# Patient Record
Sex: MALE | Race: WHITE | NOT HISPANIC OR LATINO | Employment: OTHER | URBAN - METROPOLITAN AREA
[De-identification: names, ages, dates, MRNs, and addresses within clinical notes are randomized per-mention and may not be internally consistent; named-entity substitution may affect disease eponyms.]

---

## 2019-06-11 PROBLEM — R07.9 CHEST PAIN WITH HIGH RISK OF ACUTE CORONARY SYNDROME: Status: ACTIVE | Noted: 2019-06-11

## 2019-06-26 PROBLEM — K21.9 GASTROESOPHAGEAL REFLUX DISEASE WITHOUT ESOPHAGITIS: Status: ACTIVE | Noted: 2019-06-26

## 2019-07-10 PROBLEM — I10 HYPERTENSION: Status: ACTIVE | Noted: 2019-07-10

## 2019-07-10 PROBLEM — E78.00 HYPERCHOLESTEREMIA: Status: ACTIVE | Noted: 2019-07-10

## 2020-01-06 PROBLEM — I25.10 CORONARY ARTERY DISEASE DUE TO CALCIFIED CORONARY LESION: Status: ACTIVE | Noted: 2020-01-06

## 2020-01-06 PROBLEM — R09.89 LEFT CAROTID BRUIT: Status: ACTIVE | Noted: 2020-01-06

## 2020-01-06 PROBLEM — I25.84 CORONARY ARTERY DISEASE DUE TO CALCIFIED CORONARY LESION: Status: ACTIVE | Noted: 2020-01-06

## 2021-03-24 ENCOUNTER — OFFICE VISIT (OUTPATIENT)
Dept: URGENT CARE | Facility: CLINIC | Age: 61
End: 2021-03-24
Payer: MEDICAID

## 2021-03-24 VITALS
WEIGHT: 165 LBS | DIASTOLIC BLOOD PRESSURE: 85 MMHG | SYSTOLIC BLOOD PRESSURE: 157 MMHG | HEIGHT: 67 IN | OXYGEN SATURATION: 98 % | TEMPERATURE: 97 F | HEART RATE: 70 BPM | RESPIRATION RATE: 16 BRPM | BODY MASS INDEX: 25.9 KG/M2

## 2021-03-24 DIAGNOSIS — K04.7 DENTAL ABSCESS: Primary | ICD-10-CM

## 2021-03-24 DIAGNOSIS — J30.2 SEASONAL ALLERGIC RHINITIS, UNSPECIFIED TRIGGER: ICD-10-CM

## 2021-03-24 PROCEDURE — 99203 PR OFFICE/OUTPT VISIT, NEW, LEVL III, 30-44 MIN: ICD-10-PCS | Mod: S$GLB,,, | Performed by: NURSE PRACTITIONER

## 2021-03-24 PROCEDURE — 99203 OFFICE O/P NEW LOW 30 MIN: CPT | Mod: S$GLB,,, | Performed by: NURSE PRACTITIONER

## 2021-03-24 RX ORDER — CLINDAMYCIN HYDROCHLORIDE 150 MG/1
450 CAPSULE ORAL EVERY 8 HOURS
Qty: 90 CAPSULE | Refills: 0 | Status: SHIPPED | OUTPATIENT
Start: 2021-03-24 | End: 2021-04-03

## 2021-03-24 RX ORDER — LIDOCAINE HYDROCHLORIDE 20 MG/ML
SOLUTION OROPHARYNGEAL
Qty: 100 ML | Refills: 0 | Status: SHIPPED | OUTPATIENT
Start: 2021-03-24 | End: 2022-02-17

## 2021-03-24 RX ORDER — LORATADINE 10 MG/1
10 TABLET ORAL DAILY
Qty: 20 TABLET | Refills: 0 | Status: SHIPPED | OUTPATIENT
Start: 2021-03-24 | End: 2022-02-17

## 2022-02-17 ENCOUNTER — OFFICE VISIT (OUTPATIENT)
Dept: URGENT CARE | Facility: CLINIC | Age: 62
End: 2022-02-17
Payer: MEDICAID

## 2022-02-17 VITALS
RESPIRATION RATE: 19 BRPM | HEIGHT: 68 IN | BODY MASS INDEX: 23.49 KG/M2 | TEMPERATURE: 99 F | HEART RATE: 78 BPM | SYSTOLIC BLOOD PRESSURE: 135 MMHG | DIASTOLIC BLOOD PRESSURE: 86 MMHG | WEIGHT: 155 LBS | OXYGEN SATURATION: 97 %

## 2022-02-17 DIAGNOSIS — L50.9 URTICARIA: ICD-10-CM

## 2022-02-17 DIAGNOSIS — R22.0 TONGUE SWELLING: Primary | ICD-10-CM

## 2022-02-17 DIAGNOSIS — T78.40XA ACUTE ALLERGIC REACTION, INITIAL ENCOUNTER: ICD-10-CM

## 2022-02-17 PROCEDURE — 3008F PR BODY MASS INDEX (BMI) DOCUMENTED: ICD-10-PCS | Mod: CPTII,S$GLB,, | Performed by: PHYSICIAN ASSISTANT

## 2022-02-17 PROCEDURE — 99214 PR OFFICE/OUTPT VISIT, EST, LEVL IV, 30-39 MIN: ICD-10-PCS | Mod: S$GLB,,, | Performed by: PHYSICIAN ASSISTANT

## 2022-02-17 PROCEDURE — 99214 OFFICE O/P EST MOD 30 MIN: CPT | Mod: S$GLB,,, | Performed by: PHYSICIAN ASSISTANT

## 2022-02-17 PROCEDURE — 3008F BODY MASS INDEX DOCD: CPT | Mod: CPTII,S$GLB,, | Performed by: PHYSICIAN ASSISTANT

## 2022-02-17 PROCEDURE — 1159F MED LIST DOCD IN RCRD: CPT | Mod: CPTII,S$GLB,, | Performed by: PHYSICIAN ASSISTANT

## 2022-02-17 PROCEDURE — 3075F SYST BP GE 130 - 139MM HG: CPT | Mod: CPTII,S$GLB,, | Performed by: PHYSICIAN ASSISTANT

## 2022-02-17 PROCEDURE — 1160F RVW MEDS BY RX/DR IN RCRD: CPT | Mod: CPTII,S$GLB,, | Performed by: PHYSICIAN ASSISTANT

## 2022-02-17 PROCEDURE — 1159F PR MEDICATION LIST DOCUMENTED IN MEDICAL RECORD: ICD-10-PCS | Mod: CPTII,S$GLB,, | Performed by: PHYSICIAN ASSISTANT

## 2022-02-17 PROCEDURE — 1160F PR REVIEW ALL MEDS BY PRESCRIBER/CLIN PHARMACIST DOCUMENTED: ICD-10-PCS | Mod: CPTII,S$GLB,, | Performed by: PHYSICIAN ASSISTANT

## 2022-02-17 PROCEDURE — 3079F PR MOST RECENT DIASTOLIC BLOOD PRESSURE 80-89 MM HG: ICD-10-PCS | Mod: CPTII,S$GLB,, | Performed by: PHYSICIAN ASSISTANT

## 2022-02-17 PROCEDURE — 3075F PR MOST RECENT SYSTOLIC BLOOD PRESS GE 130-139MM HG: ICD-10-PCS | Mod: CPTII,S$GLB,, | Performed by: PHYSICIAN ASSISTANT

## 2022-02-17 PROCEDURE — 3079F DIAST BP 80-89 MM HG: CPT | Mod: CPTII,S$GLB,, | Performed by: PHYSICIAN ASSISTANT

## 2022-02-17 RX ORDER — DIPHENHYDRAMINE HCL 25 MG
25 CAPSULE ORAL
Status: COMPLETED | OUTPATIENT
Start: 2022-02-17 | End: 2022-02-17

## 2022-02-17 RX ORDER — PREDNISONE 50 MG/1
50 TABLET ORAL DAILY
Qty: 5 TABLET | Refills: 0 | Status: SHIPPED | OUTPATIENT
Start: 2022-02-18 | End: 2022-02-23

## 2022-02-17 RX ORDER — FAMOTIDINE 20 MG/1
20 TABLET, FILM COATED ORAL 2 TIMES DAILY
Qty: 20 TABLET | Refills: 0 | Status: SHIPPED | OUTPATIENT
Start: 2022-02-17 | End: 2022-02-27

## 2022-02-17 RX ORDER — METOPROLOL SUCCINATE 25 MG/1
25 TABLET, EXTENDED RELEASE ORAL DAILY
COMMUNITY
Start: 2022-02-12

## 2022-02-17 RX ORDER — ALPRAZOLAM 0.25 MG/1
0.25 TABLET ORAL DAILY PRN
COMMUNITY
Start: 2022-02-01

## 2022-02-17 RX ORDER — EZETIMIBE 10 MG/1
10 TABLET ORAL DAILY
COMMUNITY
Start: 2022-01-26

## 2022-02-17 RX ADMIN — Medication 25 MG: at 04:02

## 2022-02-17 NOTE — PATIENT INSTRUCTIONS
You must understand that you have received treatment at an Urgent Care facility only, and that you may be  released before all of your medical problems are known or treated. Urgent Care facilities are not equipped to  handle life threatening emergencies. It is recommended that you seek care at an Emergency Department for  further evaluation of worsening or concerning symptoms, or possibly life threatening conditions as  discussed.  Patient Education       Allergic Reaction ED   General Information   You came to the Emergency Department (ED) for an allergic reaction. This is your bodys response to an allergen. Some people have a rash, hives, trouble breathing, or swelling. Others may throw up, feel dizzy, or pass out. This problem can be caused by things like:  · Food.  · Medicine.  · Insect stings or bites.  · Exercise.  · Latex.  · Triggers that cant be identified at the time.  Some people can come in contact with these things and have no problems. But when you have an allergy to something, your body acts as if the substance is harming you. This causes symptoms.  What care is needed at home?   · Call your regular doctor to let them know you were in the ED. Make a follow-up appointment if you were told to.  · If you were told to see an allergist, ask your regular doctor for a referral.  · If you were prescribed an epinephrine autoinjector, fill the prescription right away. Make sure you know how to use it.  · Avoid the allergen if you know what caused your reaction. You may need to work with your regular doctor or an allergist to find what has caused your reaction. Then you can try to avoid it in the future.  · Use a cool washcloth on your eyes or skin.  · If possible, rest for the next few days.  · Use over-the-counter medicines to help with your milder symptoms.  · Use antihistamine eye drops to help with itching and hives.  When do I need to get emergency help?   · Call an ambulance right away if:   ? You have  signs of a severe reaction like:  § You have trouble breathing, wheezing, or have a cough that wont stop.  § You feel like your throat is closing or your lips or tongue are swelling.  § You feel very weak like you are going to pass out, or actually pass out.  ? If you have signs of a severe allergic reaction, use your epinephrine autoinjector right away if you have one, then call for an ambulance.  When do I need to call the doctor?   · You are not  having a severe reaction but do have other signs of an allergic reaction, such as:  ? You have a rash, skin redness, flushing, or hives.  ? Your skin itches.  ? You have belly pain or an upset stomach.  · You are not feeling better in 2 to 3 days.  · You have new or worsening symptoms.  Last Reviewed Date   2021-02-11  Consumer Information Use and Disclaimer   This information is not specific medical advice and does not replace information you receive from your health care provider. This is only a brief summary of general information. It does NOT include all information about conditions, illnesses, injuries, tests, procedures, treatments, therapies, discharge instructions or life-style choices that may apply to you. You must talk with your health care provider for complete information about your health and treatment options. This information should not be used to decide whether or not to accept your health care providers advice, instructions or recommendations. Only your health care provider has the knowledge and training to provide advice that is right for you.  Copyright   Copyright © 2021 [employer] and its affiliates and/or licensors. All rights reserved.  Patient Education       Hives Discharge Instructions   About this topic   Hives are itchy red bumps or welts on the skin. They are your bodys response to an allergen. Hives can be caused by things like:  · Food.  · Medicine.  · Exercise  · Latex.  · Triggers that cant be identified at the time.  Some people  can come in contact with these things and have no problems. But when you have an allergy to something, your body acts as if the substance is harming you. This causes symptoms.  Hives may fade after a few hours but then new ones may appear. Hives are often gone within a few days, but some may last for weeks.     What care is needed at home?   · Ask your doctor what you need to do when you go home. Make sure you ask questions if you do not understand what the doctor says.  · If you were told to see an allergist, ask your regular doctor for a referral.  · If you were prescribed an epinephrine autoinjector, fill the prescription right away. Make sure you know how to use it.  · Avoid the allergen if you know what caused your reaction. You may need to work with your regular doctor or an allergist to find what has caused your reaction. Then you can try to avoid it in the future.  · Use a cool washcloth on your eyes or skin.  · If possible, rest for the next few days.  · Use over-the-counter medicines to help with your milder symptoms.  · Use antihistamine eye drops to help with itching and hives.  What follow-up care is needed?   Your doctor may ask you to make visits to the office to check on your progress. Be sure to keep these visits. You may need to see a special doctor to find out what you are allergic to.  What drugs may be needed?   The doctor may order drugs to:  · Treat allergies  · Relieve itching and decrease the size of your hives  If your hives are bad, the doctor may give you a drug called steroids. This will help ease itching and swelling. These steroids are different from the drugs athletes take to build muscle. You will only take these for a short time.  What changes to diet are needed?   Do not eat any food that causes you to get hives. These foods may include eggs, shellfish, nuts, chocolate, tomatoes, fresh berries, and milk.  What problems could happen?   Sometimes, hives can cause serious problems.  Your throat, lips, or tongue may swell or you can have a total body allergy called anaphylaxis. Both problems make it hard for you to breathe. If you have trouble breathing, chest pain, fast or irregular heartbeat, confusion, fainting, collapse, or seizures go to the ER right way.  What can be done to prevent this health problem?   You can prevent hives if you know what causes them. Stay away from anything that gives you hives. If you do not know what causes your hives, it can be hard to prevent them.  When do I need to call the doctor?   Call for emergency help right away if:   · You have signs of a severe reaction like:  ? You have trouble breathing, wheezing, or have a cough that wont stop.  ? You feel like your throat is closing or your lips or tongue are swelling.  ? You feel very weak like you are going to pass out, or actually pass out.  · If you have signs of a severe allergic reaction, use your epinephrine autoinjector right away if you have one, then call for an ambulance.  When do I need to call the doctor?   · You are not  having a severe reaction but do have other signs of an allergic reaction, such as:  ? You have a rash, skin redness, flushing, or hives.  ? Your skin itches.  ? You have belly pain or an upset stomach.  · You are not feeling better in 2 to 3 days.  Teach Back: Helping You Understand   The Teach Back Method helps you understand the information we are giving you. After you talk with the staff, tell them in your own words what you learned. This helps to make sure the staff has described each thing clearly. It also helps to explain things that may have been confusing. Before going home, make sure you can do these:  · I can tell you about my condition.  · I can tell you what may help me feel better.  · I can tell you what I will do if I suddenly have trouble breathing.  Where can I learn more?   American Academy [employer]hma &  Immunology  http://Aaaai.org/conditions-and-treatments/library/allergy-library/hives-angioedema   American Academy of Dermatology  http://www.aad.org/skin-conditions/xiolygcixya-z-gf-z/hives   [employer]  http://kidshealth.org/parent/infections/skin/hives.html   [facility] Choices  http://www.nhs.uk/conditions/nettle-rash/pages/introduction.aspx   [employer]  http://[employer]/contents/hives-urticaria-beyond-the-basics   [employer]  http://[employer]/urticaria-signs-symptoms-9262648   Last Reviewed Date   2021-06-16  Consumer Information Use and Disclaimer   This information is not specific medical advice and does not replace information you receive from your health care provider. This is only a brief summary of general information. It does NOT include all information about conditions, illnesses, injuries, tests, procedures, treatments, therapies, discharge instructions or life-style choices that may apply to you. You must talk with your health care provider for complete information about your health and treatment options. This information should not be used to decide whether or not to accept your health care providers advice, instructions or recommendations. Only your health care provider has the knowledge and training to provide advice that is right for you.  Copyright   Copyright © 2021 [employer] and its affiliates and/or licensors. All rights reserved.

## 2022-02-17 NOTE — PROGRESS NOTES
"Subjective:       Patient ID: Nader Mills is a 62 y.o. male.    Vitals:  height is 5' 7.5" (1.715 m) and weight is 70.3 kg (155 lb). His oral temperature is 98.6 °F (37 °C). His blood pressure is 135/86 and his pulse is 78. His respiration is 19 and oxygen saturation is 97%.     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 Was taken of medication. Reports swelling to his left side of tongue that started 30 mins PTA. Also with urticarial rash to bilateral axilla. Took 25 mg benadryl PTA. Denies chest tightness, difficulty swallowing, SOB or difficulty breathing. Denies any known allergens, changes in medications, possible food allergies. Reports prior to symptoms, he drank a sprite zero, Dr Pepper and ate a peanut butter cup. Denies known peanut allergy    Allergic Reaction  This is a new problem. The current episode started today. The problem has been gradually worsening since onset. The problem is moderate. It is unknown what he was exposed to. The exposure occurred at home. Associated symptoms include itching (under his arms) and a rash (under his arms). Pertinent negatives include no abdominal pain, chest pain, chest pressure, coughing, diarrhea, difficulty breathing, drooling, eye itching, eye watering, skin blistering, trouble swallowing, vomiting or wheezing. Treatments tried: benadryl  The treatment provided no relief. His past medical history is significant for medication allergies. Swelling is present on the tongue.       HENT: Negative for drooling and trouble swallowing.    Cardiovascular: Negative for chest pain.   Eyes: Negative for eye itching.   Respiratory: Negative for cough and wheezing.    Gastrointestinal: Negative for abdominal pain, vomiting and diarrhea.   Skin: Positive for rash (under his arms).       Objective:      Physical Exam   Constitutional: He is oriented to person, place, and time. He appears well-developed and well-nourished. He is " cooperative.  Non-toxic appearance. He does not have a sickly appearance. He does not appear ill. No distress.   HENT:   Head: Normocephalic and atraumatic.   Ears:   Right Ear: Hearing normal.   Left Ear: Hearing normal.   Mouth/Throat: Uvula is midline, oropharynx is clear and moist and mucous membranes are normal. Mucous membranes are not dry. No trismus in the jaw. No uvula swelling. No oropharyngeal exudate, posterior oropharyngeal edema, posterior oropharyngeal erythema, tonsillar abscesses or cobblestoning. No tonsillar exudate.   Mild swelling noted to tongue, L>R. No swelling to posterior oropharynx      Comments: Mild swelling noted to tongue, L>R. No swelling to posterior oropharynx  Eyes: Conjunctivae and lids are normal. No scleral icterus.   Neck: Trachea normal and phonation normal. Neck supple. No edema present. No erythema present. No neck rigidity present.   Cardiovascular: Normal rate, regular rhythm, normal heart sounds and normal pulses.   No murmur heard.Exam reveals no gallop and no friction rub.   Pulmonary/Chest: Effort normal and breath sounds normal. No stridor. No respiratory distress. He has no wheezes. He has no rhonchi. He has no rales.   Abdominal: Normal appearance.   Neurological: He is alert and oriented to person, place, and time. Coordination normal.   Skin: Skin is dry, intact, not diaphoretic, not pale, rash and urticarial.         Comments: Urticaria rash to bilateral axilla with reported pruritus    Psychiatric: He has a normal mood and affect. His speech is normal and behavior is normal. Judgment and thought content normal. Cognition and memory  Nursing note and vitals reviewed.        Assessment:       1. Tongue swelling    2. Urticaria    3. Acute allergic reaction, initial encounter          Plan:         Tongue swelling  -     diphenhydrAMINE capsule 25 mg  -     Discontinue: methylPREDNISolone sod suc(PF) injection 125 mg  -     methylPREDNISolone sod suc(PF) injection  125 mg  -     predniSONE (DELTASONE) 50 MG Tab; Take 1 tablet (50 mg total) by mouth once daily. for 5 days  Dispense: 5 tablet; Refill: 0  -     famotidine (PEPCID) 20 MG tablet; Take 1 tablet (20 mg total) by mouth 2 (two) times daily. for 10 days  Dispense: 20 tablet; Refill: 0    Urticaria  -     diphenhydrAMINE capsule 25 mg  -     Discontinue: methylPREDNISolone sod suc(PF) injection 125 mg  -     methylPREDNISolone sod suc(PF) injection 125 mg  -     predniSONE (DELTASONE) 50 MG Tab; Take 1 tablet (50 mg total) by mouth once daily. for 5 days  Dispense: 5 tablet; Refill: 0  -     famotidine (PEPCID) 20 MG tablet; Take 1 tablet (20 mg total) by mouth 2 (two) times daily. for 10 days  Dispense: 20 tablet; Refill: 0    Acute allergic reaction, initial encounter  -     diphenhydrAMINE capsule 25 mg  -     methylPREDNISolone sod suc(PF) injection 125 mg  -     predniSONE (DELTASONE) 50 MG Tab; Take 1 tablet (50 mg total) by mouth once daily. for 5 days  Dispense: 5 tablet; Refill: 0  -     famotidine (PEPCID) 20 MG tablet; Take 1 tablet (20 mg total) by mouth 2 (two) times daily. for 10 days  Dispense: 20 tablet; Refill: 0    4:36 PM given 25 mg benadryl, patient already took 25 mg at home. Also IM solumedrol. Will monitor in clinic. 4:59 PM no worsening of symptoms. Will continue to monitor. 5:16 PM symptoms have resolved. Patient states feeling much better. Urged to continue benadryl. Will also send in prednisone and pepcid. Urged to discontinue peanut butter consumption until sure not new allergy. Given strict ER precautions for any persistent or worsening symptoms. States understanding.     Discussed with patient the importance of f/u with their primary care provider. Urged to go to the ER for any worsening signs or symptoms.     Patient Instructions   You must understand that you have received treatment at an Urgent Care facility only, and that you may be  released before all of your medical problems are  known or treated. Urgent Care facilities are not equipped to  handle life threatening emergencies. It is recommended that you seek care at an Emergency Department for  further evaluation of worsening or concerning symptoms, or possibly life threatening conditions as  discussed.  Patient Education       Allergic Reaction ED   General Information   You came to the Emergency Department (ED) for an allergic reaction. This is your bodys response to an allergen. Some people have a rash, hives, trouble breathing, or swelling. Others may throw up, feel dizzy, or pass out. This problem can be caused by things like:  · Food.  · Medicine.  · Insect stings or bites.  · Exercise.  · Latex.  · Triggers that cant be identified at the time.  Some people can come in contact with these things and have no problems. But when you have an allergy to something, your body acts as if the substance is harming you. This causes symptoms.  What care is needed at home?   · Call your regular doctor to let them know you were in the ED. Make a follow-up appointment if you were told to.  · If you were told to see an allergist, ask your regular doctor for a referral.  · If you were prescribed an epinephrine autoinjector, fill the prescription right away. Make sure you know how to use it.  · Avoid the allergen if you know what caused your reaction. You may need to work with your regular doctor or an allergist to find what has caused your reaction. Then you can try to avoid it in the future.  · Use a cool washcloth on your eyes or skin.  · If possible, rest for the next few days.  · Use over-the-counter medicines to help with your milder symptoms.  · Use antihistamine eye drops to help with itching and hives.  When do I need to get emergency help?   · Call an ambulance right away if:   ? You have signs of a severe reaction like:  § You have trouble breathing, wheezing, or have a cough that wont stop.  § You feel like your throat is closing or your  lips or tongue are swelling.  § You feel very weak like you are going to pass out, or actually pass out.  ? If you have signs of a severe allergic reaction, use your epinephrine autoinjector right away if you have one, then call for an ambulance.  When do I need to call the doctor?   · You are not  having a severe reaction but do have other signs of an allergic reaction, such as:  ? You have a rash, skin redness, flushing, or hives.  ? Your skin itches.  ? You have belly pain or an upset stomach.  · You are not feeling better in 2 to 3 days.  · You have new or worsening symptoms.  Last Reviewed Date   2021-02-11  Consumer Information Use and Disclaimer   This information is not specific medical advice and does not replace information you receive from your health care provider. This is only a brief summary of general information. It does NOT include all information about conditions, illnesses, injuries, tests, procedures, treatments, therapies, discharge instructions or life-style choices that may apply to you. You must talk with your health care provider for complete information about your health and treatment options. This information should not be used to decide whether or not to accept your health care providers advice, instructions or recommendations. Only your health care provider has the knowledge and training to provide advice that is right for you.  Copyright   Copyright © 2021 [employer] and its affiliates and/or licensors. All rights reserved.  Patient Education       Hives Discharge Instructions   About this topic   Hives are itchy red bumps or welts on the skin. They are your bodys response to an allergen. Hives can be caused by things like:  · Food.  · Medicine.  · Exercise  · Latex.  · Triggers that cant be identified at the time.  Some people can come in contact with these things and have no problems. But when you have an allergy to something, your body acts as if the substance is harming you.  This causes symptoms.  Hives may fade after a few hours but then new ones may appear. Hives are often gone within a few days, but some may last for weeks.     What care is needed at home?   · Ask your doctor what you need to do when you go home. Make sure you ask questions if you do not understand what the doctor says.  · If you were told to see an allergist, ask your regular doctor for a referral.  · If you were prescribed an epinephrine autoinjector, fill the prescription right away. Make sure you know how to use it.  · Avoid the allergen if you know what caused your reaction. You may need to work with your regular doctor or an allergist to find what has caused your reaction. Then you can try to avoid it in the future.  · Use a cool washcloth on your eyes or skin.  · If possible, rest for the next few days.  · Use over-the-counter medicines to help with your milder symptoms.  · Use antihistamine eye drops to help with itching and hives.  What follow-up care is needed?   Your doctor may ask you to make visits to the office to check on your progress. Be sure to keep these visits. You may need to see a special doctor to find out what you are allergic to.  What drugs may be needed?   The doctor may order drugs to:  · Treat allergies  · Relieve itching and decrease the size of your hives  If your hives are bad, the doctor may give you a drug called steroids. This will help ease itching and swelling. These steroids are different from the drugs athletes take to build muscle. You will only take these for a short time.  What changes to diet are needed?   Do not eat any food that causes you to get hives. These foods may include eggs, shellfish, nuts, chocolate, tomatoes, fresh berries, and milk.  What problems could happen?   Sometimes, hives can cause serious problems. Your throat, lips, or tongue may swell or you can have a total body allergy called anaphylaxis. Both problems make it hard for you to breathe. If you have  trouble breathing, chest pain, fast or irregular heartbeat, confusion, fainting, collapse, or seizures go to the ER right way.  What can be done to prevent this health problem?   You can prevent hives if you know what causes them. Stay away from anything that gives you hives. If you do not know what causes your hives, it can be hard to prevent them.  When do I need to call the doctor?   Call for emergency help right away if:   · You have signs of a severe reaction like:  ? You have trouble breathing, wheezing, or have a cough that wont stop.  ? You feel like your throat is closing or your lips or tongue are swelling.  ? You feel very weak like you are going to pass out, or actually pass out.  · If you have signs of a severe allergic reaction, use your epinephrine autoinjector right away if you have one, then call for an ambulance.  When do I need to call the doctor?   · You are not  having a severe reaction but do have other signs of an allergic reaction, such as:  ? You have a rash, skin redness, flushing, or hives.  ? Your skin itches.  ? You have belly pain or an upset stomach.  · You are not feeling better in 2 to 3 days.  Teach Back: Helping You Understand   The Teach Back Method helps you understand the information we are giving you. After you talk with the staff, tell them in your own words what you learned. This helps to make sure the staff has described each thing clearly. It also helps to explain things that may have been confusing. Before going home, make sure you can do these:  · I can tell you about my condition.  · I can tell you what may help me feel better.  · I can tell you what I will do if I suddenly have trouble breathing.  Where can I learn more?   American Academy of Allergy Asthma & Immunology  http://Aaaai.org/conditions-and-treatments/library/allergy-library/hives-angioedema   American Academy of Dermatology  http://www.aad.org/skin-conditions/btdtqxrckyc-e-ej-z/hives    KidsHealth  http://kidshealth.org/parent/infections/skin/hives.html   NHS Choices  http://www.nhs.uk/conditions/nettle-rash/pages/introduction.aspx   [employer]  http://[employer]/contents/hives-urticaria-beyond-the-basics   [facility]  http://[employer].Video[employer]ie/urticaria-signs-symptoms-0307070   Last Reviewed Date   2021-06-16  Consumer Information Use and Disclaimer   This information is not specific medical advice and does not replace information you receive from your health care provider. This is only a brief summary of general information. It does NOT include all information about conditions, illnesses, injuries, tests, procedures, treatments, therapies, discharge instructions or life-style choices that may apply to you. You must talk with your health care provider for complete information about your health and treatment options. This information should not be used to decide whether or not to accept your health care providers advice, instructions or recommendations. Only your health care provider has the knowledge and training to provide advice that is right for you.  Copyright   Copyright © 2021 [employer], Inc. and its affiliates and/or licensors. All rights reserved.

## 2024-08-03 ENCOUNTER — HOSPITAL ENCOUNTER (EMERGENCY)
Facility: HOSPITAL | Age: 64
Discharge: HOME | End: 2024-08-03
Attending: STUDENT IN AN ORGANIZED HEALTH CARE EDUCATION/TRAINING PROGRAM
Payer: MEDICAID

## 2024-08-03 VITALS
DIASTOLIC BLOOD PRESSURE: 76 MMHG | OXYGEN SATURATION: 96 % | BODY MASS INDEX: 26.11 KG/M2 | HEART RATE: 75 BPM | SYSTOLIC BLOOD PRESSURE: 127 MMHG | RESPIRATION RATE: 18 BRPM | WEIGHT: 169.19 LBS | TEMPERATURE: 98 F

## 2024-08-03 DIAGNOSIS — R19.7 DIARRHEA, UNSPECIFIED TYPE: ICD-10-CM

## 2024-08-03 DIAGNOSIS — R73.9 HYPERGLYCEMIA: Primary | ICD-10-CM

## 2024-08-03 LAB
ALBUMIN SERPL BCP-MCNC: 3.5 G/DL (ref 3.5–5.2)
ALP SERPL-CCNC: 111 U/L (ref 55–135)
ALT SERPL W/O P-5'-P-CCNC: 67 U/L (ref 10–44)
ANION GAP SERPL CALC-SCNC: 10 MMOL/L (ref 8–16)
AST SERPL-CCNC: 32 U/L (ref 10–40)
BACTERIA #/AREA URNS HPF: NORMAL /HPF
BASOPHILS # BLD AUTO: 0.07 K/UL (ref 0–0.2)
BASOPHILS NFR BLD: 0.9 % (ref 0–1.9)
BILIRUB SERPL-MCNC: 0.6 MG/DL (ref 0.1–1)
BILIRUB UR QL STRIP: NEGATIVE
BUN SERPL-MCNC: 18 MG/DL (ref 8–23)
CALCIUM SERPL-MCNC: 9.1 MG/DL (ref 8.7–10.5)
CHLORIDE SERPL-SCNC: 101 MMOL/L (ref 95–110)
CLARITY UR: CLEAR
CO2 SERPL-SCNC: 23 MMOL/L (ref 23–29)
COLOR UR: YELLOW
CREAT SERPL-MCNC: 1 MG/DL (ref 0.5–1.4)
DIFFERENTIAL METHOD BLD: NORMAL
EOSINOPHIL # BLD AUTO: 0.3 K/UL (ref 0–0.5)
EOSINOPHIL NFR BLD: 3.3 % (ref 0–8)
ERYTHROCYTE [DISTWIDTH] IN BLOOD BY AUTOMATED COUNT: 12.6 % (ref 11.5–14.5)
EST. GFR  (NO RACE VARIABLE): >60 ML/MIN/1.73 M^2
GLUCOSE SERPL-MCNC: 354 MG/DL (ref 70–110)
GLUCOSE UR QL STRIP: ABNORMAL
HCT VFR BLD AUTO: 40.8 % (ref 40–54)
HGB BLD-MCNC: 14.5 G/DL (ref 14–18)
HGB UR QL STRIP: NEGATIVE
HYALINE CASTS #/AREA URNS LPF: 0 /LPF
IMM GRANULOCYTES # BLD AUTO: 0.02 K/UL (ref 0–0.04)
IMM GRANULOCYTES NFR BLD AUTO: 0.3 % (ref 0–0.5)
KETONES UR QL STRIP: NEGATIVE
LEUKOCYTE ESTERASE UR QL STRIP: NEGATIVE
LIPASE SERPL-CCNC: 21 U/L (ref 4–60)
LYMPHOCYTES # BLD AUTO: 2 K/UL (ref 1–4.8)
LYMPHOCYTES NFR BLD: 26.8 % (ref 18–48)
MCH RBC QN AUTO: 30.1 PG (ref 27–31)
MCHC RBC AUTO-ENTMCNC: 35.5 G/DL (ref 32–36)
MCV RBC AUTO: 85 FL (ref 82–98)
MICROSCOPIC COMMENT: NORMAL
MONOCYTES # BLD AUTO: 0.7 K/UL (ref 0.3–1)
MONOCYTES NFR BLD: 8.9 % (ref 4–15)
NEUTROPHILS # BLD AUTO: 4.5 K/UL (ref 1.8–7.7)
NEUTROPHILS NFR BLD: 59.8 % (ref 38–73)
NITRITE UR QL STRIP: NEGATIVE
NRBC BLD-RTO: 0 /100 WBC
PH UR STRIP: 6 [PH] (ref 5–8)
PLATELET # BLD AUTO: 231 K/UL (ref 150–450)
PMV BLD AUTO: 10 FL (ref 9.2–12.9)
POCT GLUCOSE: 298 MG/DL (ref 70–110)
POTASSIUM SERPL-SCNC: 3.6 MMOL/L (ref 3.5–5.1)
PROT SERPL-MCNC: 6.7 G/DL (ref 6–8.4)
PROT UR QL STRIP: NEGATIVE
RBC # BLD AUTO: 4.81 M/UL (ref 4.6–6.2)
RBC #/AREA URNS HPF: 0 /HPF (ref 0–4)
SODIUM SERPL-SCNC: 134 MMOL/L (ref 136–145)
SP GR UR STRIP: 1.01 (ref 1–1.03)
URN SPEC COLLECT METH UR: ABNORMAL
UROBILINOGEN UR STRIP-ACNC: NEGATIVE EU/DL
WBC # BLD AUTO: 7.49 K/UL (ref 3.9–12.7)
WBC #/AREA URNS HPF: 1 /HPF (ref 0–5)
WBC CLUMPS URNS QL MICRO: NORMAL
YEAST URNS QL MICRO: NORMAL

## 2024-08-03 PROCEDURE — 83690 ASSAY OF LIPASE: CPT | Performed by: NURSE PRACTITIONER

## 2024-08-03 PROCEDURE — 99284 EMERGENCY DEPT VISIT MOD MDM: CPT | Mod: 25

## 2024-08-03 PROCEDURE — 81000 URINALYSIS NONAUTO W/SCOPE: CPT | Performed by: NURSE PRACTITIONER

## 2024-08-03 PROCEDURE — 82962 GLUCOSE BLOOD TEST: CPT

## 2024-08-03 PROCEDURE — 25000003 PHARM REV CODE 250: Performed by: NURSE PRACTITIONER

## 2024-08-03 PROCEDURE — 96360 HYDRATION IV INFUSION INIT: CPT

## 2024-08-03 PROCEDURE — 80053 COMPREHEN METABOLIC PANEL: CPT | Performed by: NURSE PRACTITIONER

## 2024-08-03 PROCEDURE — 85025 COMPLETE CBC W/AUTO DIFF WBC: CPT | Performed by: NURSE PRACTITIONER

## 2024-08-03 RX ORDER — METFORMIN HYDROCHLORIDE 500 MG/1
500 TABLET ORAL 2 TIMES DAILY WITH MEALS
Qty: 60 TABLET | Refills: 0 | Status: SHIPPED | OUTPATIENT
Start: 2024-08-03

## 2024-08-03 RX ORDER — INSULIN PUMP SYRINGE, 3 ML
EACH MISCELLANEOUS
Qty: 1 EACH | Refills: 0 | Status: SHIPPED | OUTPATIENT
Start: 2024-08-03 | End: 2025-08-03

## 2024-08-03 RX ADMIN — SODIUM CHLORIDE 500 ML: 9 INJECTION, SOLUTION INTRAVENOUS at 12:08

## 2025-02-10 ENCOUNTER — OFFICE VISIT (OUTPATIENT)
Dept: URGENT CARE | Facility: CLINIC | Age: 65
End: 2025-02-10
Payer: MEDICAID

## 2025-02-10 VITALS
WEIGHT: 169.31 LBS | BODY MASS INDEX: 26.57 KG/M2 | DIASTOLIC BLOOD PRESSURE: 71 MMHG | TEMPERATURE: 99 F | SYSTOLIC BLOOD PRESSURE: 124 MMHG | OXYGEN SATURATION: 95 % | RESPIRATION RATE: 21 BRPM | HEART RATE: 89 BPM | HEIGHT: 67 IN

## 2025-02-10 DIAGNOSIS — J40 BRONCHITIS: ICD-10-CM

## 2025-02-10 DIAGNOSIS — J01.90 ACUTE SINUSITIS, RECURRENCE NOT SPECIFIED, UNSPECIFIED LOCATION: Primary | ICD-10-CM

## 2025-02-10 DIAGNOSIS — J44.1 COPD EXACERBATION: ICD-10-CM

## 2025-02-10 PROCEDURE — 99214 OFFICE O/P EST MOD 30 MIN: CPT | Mod: S$GLB,,, | Performed by: NURSE PRACTITIONER

## 2025-02-10 RX ORDER — ALBUTEROL SULFATE 90 UG/1
INHALANT RESPIRATORY (INHALATION)
COMMUNITY

## 2025-02-10 RX ORDER — PANTOPRAZOLE SODIUM 40 MG/1
TABLET, DELAYED RELEASE ORAL
COMMUNITY

## 2025-02-10 RX ORDER — DOXYCYCLINE 100 MG/1
100 CAPSULE ORAL 2 TIMES DAILY
Qty: 14 CAPSULE | Refills: 0 | Status: SHIPPED | OUTPATIENT
Start: 2025-02-10 | End: 2025-02-17

## 2025-02-10 RX ORDER — CARVEDILOL 6.25 MG/1
TABLET ORAL
COMMUNITY

## 2025-02-10 RX ORDER — HYDROCHLOROTHIAZIDE 12.5 MG/1
TABLET ORAL
COMMUNITY

## 2025-02-10 RX ORDER — TRAZODONE HYDROCHLORIDE 50 MG/1
TABLET ORAL
COMMUNITY

## 2025-02-10 RX ORDER — GLIMEPIRIDE 1 MG/1
TABLET ORAL
COMMUNITY

## 2025-02-10 NOTE — PATIENT INSTRUCTIONS
The following are suggestions to help you with upper respiratory symptoms.    Body Aches/Pains/Fever  For patients who are not allergic to and are not on anticoagulants, you can alternate Tylenol every 4 hours and Motrin every 6 hours for fever above 100.4F and/or pain.  For patients who are allergic or intolerant to NSAIDS, have gastritis, gastric ulcers, or history of GI bleeds, are pregnant, or are on anticoagulant therapy, you can take Tylenol every 4 hours as needed for fever above 100.4F and/or pain.    Congestion:    Nasal Saline   Nasal saline is available over the counter. There are several different commercial preparations such as Ocean spray and Ayr spray. There is no limit on the use of Nasal saline. Saline is used by snorting the mist up into the nose then later gently blowing the nose to get rid of any secretions that it has loosened.    Nasal irrigation   Flushing the nose and sinuses with a saline solution several times per day can decrease pain associated with congestion and shorten the duration of symptoms.     Decongestant Nasal Sprays  Over-the-counter decongestant nasal spray such as Afrin, may be helpful as an initial step in treating upper respiratory infections. This spray can be used for up to approximately 3 days and is used no more than twice per day. Topical nasal decongestant spray for longer than 5 days will result in a physical addiction, in which the nasal lining will become significantly swollen and irritated until the spray is used again. They May also cause elevated blood pressure.    Nasal Steroids  Nasal steroids, such as Flonase, can be beneficial and help reduce swelling inside the nose. These drugs have few side effects and relieve symptoms in most people. Unfortunately, they do not begin to work for 2-3 days and do not reach their maximum benefit for approximately 2-3 weeks.   There are several nasal steroids available by prescription as well as a few that can be  purchased without a prescription (over the counter). These drugs are all effective but differ in how frequently they must be used and how much they cost.    Nasal anticholinergics  Ipratropium bromide (nasal spray) is available by prescription and can be very effective in decreasing the symptom of runny nose and other related symptoms (eg, post-nasal drainage, sore throat). These sprays, like all medications, can interact with other medications, so it is important that your complete medication list be reviewed by your physician before you take this medication.    If you develop a bloody nose, stop using the medication immediately.    Oral Decongestant  Use pseudoephedrine (behind the counter) for sinus pressure and congestion- Pseudoephedrine 30 mg up to 240 mg /day. Common brands include Sudafed, Zephrex-D Wal-phed.  Warning: It can raise your blood pressure and give you palpitations, avoid with history of high blood pressure, palpitations, and severe cardiac disease.  Coricidin HBP is okay to use if you have high blood pressure.     Mucous Thinners/Decongestant Combination   Mucous thinners and decongestants are used to shrink down the tissues and promote sinus drainage. There are multiple prescription and over-the-counter medications available. A mucous thinner will tend to be drying unless you are also drinking plenty of water when taking these. If you have high blood pressure, it is very important to monitor your pressure while on decongestants. The mucous thinner/decongestant combinations are typically given twice per day. However, some people will be unable to tolerate these at night and should only take them once per day.    Clear Runny Nose/Allergic Rhinitis:  Use an antihistamine to help dry you out or nasal anticholinergics as mentioned above.     Antihistamines  Antihistamines are available both over the counter and as a prescription. There are also various decongestant and antihistamine combinations  available such as Claritin, Allegra, and Zyrtec. It is best to take any antihistamine-decongestant combination in the morning to avoid insomnia. Zyrtec should probably be taken at night, to reduce the chance of sleepiness during the daytime. If there is a significant infection present and secretions are already thickened, it is recommended to discontinue antihistamines and use a mucous thinner/decongestant combination.    Oral Steroids  Oral steroids can be used with more severe infections. Often, they are the only medications that will reduce the symptoms of pressure and allow the nasal sinuses to drain. These are best taken on a full stomach and earlier in the day is better. They may give you some irritability, stomach upset, or hyperactivity. This can also interfere with sleep.     A person who has high blood pressure or diabetes should be very careful and monitor their blood pressure or blood glucose while taking steroids. Steroids can have multiple side effects especially when taken long-term. Short-term doses are usually very well tolerated and effective in controlling the symptoms associated with sinus infections and severe allergies. The use of steroids for greater than approximately seven days requires a tapering down to discontinue them. You should not abruptly stop your steroid if you have been taking the same dose for greater than 7 days.    Body Aches/Pains/Fever  For patients who are not allergic to and are not on anticoagulants, you can alternate Tylenol every 4 hours and Motrin every 6 hours for fever above 100.4F and/or pain.  For patients who are allergic or intolerant to NSAIDS, have gastritis, gastric ulcers, or history of GI bleeds, are pregnant, or are on anticoagulant therapy, you can take Tylenol every 4 hours as needed for fever above 100.4F and/or pain.     Maintain adequate hydration - Rest and keep yourself/patient well hydrated. For adults, it is recommended to drink at least 8 glasses  of water daily.  This may help thin secretions and soothe the respiratory mucosa.     Sore Throat  Perform warm, saltwater gargles (1/2 tsp salt to 1 cup warm water) to help reduce inflammation and throat discomfort. Chloraseptic sprays and throat lozenges will also help with your throat pain.    COUGH  A viral cough may linger for 3 to 4 weeks but should steadily improve over time. Coughing is the body's natural way to clear mucus and help get rid of bacteria and viruses. Therefore, cough suppressants are usually not recommended.      Use Mucinex (guaifenesin) up to 2400mg/day to help clear and break up/loosen mucus/congestion from the chest when you have a cold or flu.      Common cough suppressants include the ingredient dextromethorphan or DM, (such as Mucinex DM) available over the counter and can be used for cough to stop the tickle in the back of your throat.      ? Honey may be beneficial, especially on nocturnal cough 1 to 2 teaspoons can be taken straight or diluted in tea, juice or other liquid.    The antioxidants in honey are an important contributor to its decongestant properties. Darker honey contains more antioxidants. Buckwheat and avocado honey are particularly good choices. If these honeys are not available in your area, choose the darkest honey you can find.    It is important to follow up for Re-evaluation if new or worsening symptoms develop or symptoms exceed the expected duration of common cold.     Worsening or persistent symptoms may indicate the development of complications or the need to consider a diagnosis other than the common cold requiring an antibiotic. (eg, acute bacterial sinusitis, pneumonia, pertussis).    Go to Emergency Department or call 911 if you develop new or worsening symptoms including but not limited to:  Trouble breathing.  New or worsening chest discomfort.  Feel confused or disoriented.  Vomiting and can't keep liquids down.  Develop signs of fluid loss, such as  dark-colored urine and muscle cramps.    **You must understand that you have received Urgent Care treatment only and that you may be released before all your medical problems are known or treated. You, the patient, are responsible to arrange for follow-up care as instructed.

## 2025-02-10 NOTE — PROGRESS NOTES
"Subjective:      Patient ID: Nader Mills is a 65 y.o. male.    Vitals:  height is 5' 7" (1.702 m) and weight is 76.8 kg (169 lb 5 oz). His oral temperature is 98.7 °F (37.1 °C). His blood pressure is 124/71 and his pulse is 89. His respiration is 21 (abnormal) and oxygen saturation is 95%.     Chief Complaint: Cough    Patient complaining of cough and congestion since last Wednesday. Patient isn'nt coughing anything up.    Cough  This is a new problem. Episode onset: last wednesday. The problem has been unchanged. The problem occurs every few minutes. The cough is Non-productive. Associated symptoms include headaches, nasal congestion and shortness of breath. Pertinent negatives include no chills, ear congestion, ear pain, fever, heartburn or myalgias. Nothing aggravates the symptoms. Treatments tried: musinex dm, nightquil. The treatment provided mild relief. His past medical history is significant for COPD.       Constitution: Negative for chills and fever.   HENT:  Negative for ear pain.    Respiratory:  Positive for cough and shortness of breath.    Gastrointestinal:  Negative for heartburn.   Musculoskeletal:  Negative for muscle ache.   Neurological:  Positive for headaches.      Objective:     Physical Exam   Constitutional: He is oriented to person, place, and time. He appears well-developed. He is cooperative.  Non-toxic appearance. No distress.   HENT:   Head: Normocephalic and atraumatic.   Ears:   Right Ear: Tympanic membrane, external ear and ear canal normal.   Left Ear: Tympanic membrane, external ear and ear canal normal.   Nose: Mucosal edema and rhinorrhea present. No nasal deformity. No epistaxis. Right sinus exhibits no maxillary sinus tenderness and no frontal sinus tenderness. Left sinus exhibits no maxillary sinus tenderness and no frontal sinus tenderness.   Mouth/Throat: Uvula is midline, oropharynx is clear and moist and mucous membranes are normal. No trismus in the jaw. Normal dentition. " No uvula swelling. No oropharyngeal exudate, posterior oropharyngeal edema or posterior oropharyngeal erythema.   Eyes: Conjunctivae and lids are normal. No scleral icterus.   Neck: Trachea normal and phonation normal. Neck supple. No edema present. No erythema present. No neck rigidity present.   Cardiovascular: Normal rate, regular rhythm, normal heart sounds and normal pulses.   Pulmonary/Chest: Effort normal. No tachypnea (resp 18 time of exam). No respiratory distress. He has decreased breath sounds. He has wheezes. He has rhonchi.   No acute distress.  Speaking in complete sentences         Comments: No acute distress.  Speaking in complete sentences    Abdominal: Normal appearance.   Musculoskeletal: Normal range of motion.         General: No deformity. Normal range of motion.   Neurological: He is alert and oriented to person, place, and time. He exhibits normal muscle tone. Coordination normal.   Skin: Skin is warm, dry, intact, not diaphoretic and not pale.   Psychiatric: His speech is normal and behavior is normal. Judgment and thought content normal.   Nursing note and vitals reviewed.      Assessment:     1. Acute sinusitis, recurrence not specified, unspecified location    2. COPD exacerbation    3. Bronchitis        Plan:     Continue albuterol     Acute sinusitis, recurrence not specified, unspecified location  -     doxycycline (MONODOX) 100 MG capsule; Take 1 capsule (100 mg total) by mouth 2 (two) times daily. for 7 days  Dispense: 14 capsule; Refill: 0    COPD exacerbation  -     doxycycline (MONODOX) 100 MG capsule; Take 1 capsule (100 mg total) by mouth 2 (two) times daily. for 7 days  Dispense: 14 capsule; Refill: 0    Bronchitis  -     doxycycline (MONODOX) 100 MG capsule; Take 1 capsule (100 mg total) by mouth 2 (two) times daily. for 7 days  Dispense: 14 capsule; Refill: 0      No x-ray tech, unable to obtain chest x-ray.  Patient agreed to follow-up with primary care provider in 1-2  days.

## 2025-06-05 ENCOUNTER — PATIENT OUTREACH (OUTPATIENT)
Dept: ADMINISTRATIVE | Facility: HOSPITAL | Age: 65
End: 2025-06-05
Payer: MEDICAID